# Patient Record
Sex: FEMALE | Race: WHITE | NOT HISPANIC OR LATINO | URBAN - METROPOLITAN AREA
[De-identification: names, ages, dates, MRNs, and addresses within clinical notes are randomized per-mention and may not be internally consistent; named-entity substitution may affect disease eponyms.]

---

## 2018-06-29 ENCOUNTER — INPATIENT (INPATIENT)
Facility: HOSPITAL | Age: 53
LOS: 1 days | Discharge: HOME | End: 2018-07-01
Attending: INTERNAL MEDICINE | Admitting: INTERNAL MEDICINE
Payer: COMMERCIAL

## 2018-06-29 VITALS
OXYGEN SATURATION: 95 % | HEIGHT: 66 IN | SYSTOLIC BLOOD PRESSURE: 94 MMHG | HEART RATE: 73 BPM | TEMPERATURE: 98 F | WEIGHT: 139.99 LBS | RESPIRATION RATE: 18 BRPM | DIASTOLIC BLOOD PRESSURE: 55 MMHG

## 2018-06-29 LAB
ALBUMIN SERPL ELPH-MCNC: 3.8 G/DL — SIGNIFICANT CHANGE UP (ref 3.3–5)
ALP SERPL-CCNC: 46 U/L — SIGNIFICANT CHANGE UP (ref 40–120)
ALT FLD-CCNC: 21 U/L — SIGNIFICANT CHANGE UP (ref 12–78)
ANION GAP SERPL CALC-SCNC: 10 MMOL/L — SIGNIFICANT CHANGE UP (ref 5–17)
AST SERPL-CCNC: 14 U/L — LOW (ref 15–37)
BASOPHILS # BLD AUTO: 0.13 K/UL — SIGNIFICANT CHANGE UP (ref 0–0.2)
BASOPHILS NFR BLD AUTO: 1.4 % — SIGNIFICANT CHANGE UP (ref 0–2)
BILIRUB SERPL-MCNC: 0.3 MG/DL — SIGNIFICANT CHANGE UP (ref 0.2–1.2)
BUN SERPL-MCNC: 17 MG/DL — SIGNIFICANT CHANGE UP (ref 7–23)
CALCIUM SERPL-MCNC: 8.8 MG/DL — SIGNIFICANT CHANGE UP (ref 8.5–10.1)
CHLORIDE SERPL-SCNC: 104 MMOL/L — SIGNIFICANT CHANGE UP (ref 96–108)
CO2 SERPL-SCNC: 25 MMOL/L — SIGNIFICANT CHANGE UP (ref 22–31)
CREAT SERPL-MCNC: 0.85 MG/DL — SIGNIFICANT CHANGE UP (ref 0.5–1.3)
EOSINOPHIL # BLD AUTO: 0.23 K/UL — SIGNIFICANT CHANGE UP (ref 0–0.5)
EOSINOPHIL NFR BLD AUTO: 2.4 % — SIGNIFICANT CHANGE UP (ref 0–6)
GLUCOSE SERPL-MCNC: 114 MG/DL — HIGH (ref 70–99)
HCT VFR BLD CALC: 39.9 % — SIGNIFICANT CHANGE UP (ref 34.5–45)
HGB BLD-MCNC: 13.6 G/DL — SIGNIFICANT CHANGE UP (ref 11.5–15.5)
IMM GRANULOCYTES NFR BLD AUTO: 0.3 % — SIGNIFICANT CHANGE UP (ref 0–1.5)
LYMPHOCYTES # BLD AUTO: 4.3 K/UL — HIGH (ref 1–3.3)
LYMPHOCYTES # BLD AUTO: 44.9 % — HIGH (ref 13–44)
MANUAL SMEAR VERIFICATION: SIGNIFICANT CHANGE UP
MCHC RBC-ENTMCNC: 30 PG — SIGNIFICANT CHANGE UP (ref 27–34)
MCHC RBC-ENTMCNC: 34.1 GM/DL — SIGNIFICANT CHANGE UP (ref 32–36)
MCV RBC AUTO: 87.9 FL — SIGNIFICANT CHANGE UP (ref 80–100)
MONOCYTES # BLD AUTO: 0.81 K/UL — SIGNIFICANT CHANGE UP (ref 0–0.9)
MONOCYTES NFR BLD AUTO: 8.5 % — SIGNIFICANT CHANGE UP (ref 2–14)
NEUTROPHILS # BLD AUTO: 4.08 K/UL — SIGNIFICANT CHANGE UP (ref 1.8–7.4)
NEUTROPHILS NFR BLD AUTO: 42.5 % — LOW (ref 43–77)
NRBC # BLD: 0 /100 WBCS — SIGNIFICANT CHANGE UP (ref 0–0)
PLAT MORPH BLD: NORMAL — SIGNIFICANT CHANGE UP
PLATELET # BLD AUTO: 253 K/UL — SIGNIFICANT CHANGE UP (ref 150–400)
POTASSIUM SERPL-MCNC: 3.3 MMOL/L — LOW (ref 3.5–5.3)
POTASSIUM SERPL-SCNC: 3.3 MMOL/L — LOW (ref 3.5–5.3)
PROT SERPL-MCNC: 7.3 GM/DL — SIGNIFICANT CHANGE UP (ref 6–8.3)
RBC # BLD: 4.54 M/UL — SIGNIFICANT CHANGE UP (ref 3.8–5.2)
RBC # FLD: 14.5 % — SIGNIFICANT CHANGE UP (ref 10.3–14.5)
RBC BLD AUTO: NORMAL — SIGNIFICANT CHANGE UP
SODIUM SERPL-SCNC: 139 MMOL/L — SIGNIFICANT CHANGE UP (ref 135–145)
TROPONIN I SERPL-MCNC: <0.015 NG/ML — SIGNIFICANT CHANGE UP (ref 0.01–0.04)
WBC # BLD: 9.58 K/UL — SIGNIFICANT CHANGE UP (ref 3.8–10.5)
WBC # FLD AUTO: 9.58 K/UL — SIGNIFICANT CHANGE UP (ref 3.8–10.5)

## 2018-06-29 PROCEDURE — 99285 EMERGENCY DEPT VISIT HI MDM: CPT

## 2018-06-29 PROCEDURE — 71045 X-RAY EXAM CHEST 1 VIEW: CPT | Mod: 26

## 2018-06-29 PROCEDURE — 93010 ELECTROCARDIOGRAM REPORT: CPT

## 2018-06-29 PROCEDURE — 70450 CT HEAD/BRAIN W/O DYE: CPT | Mod: 26

## 2018-06-29 RX ORDER — SODIUM CHLORIDE 9 MG/ML
3 INJECTION INTRAMUSCULAR; INTRAVENOUS; SUBCUTANEOUS ONCE
Qty: 0 | Refills: 0 | Status: COMPLETED | OUTPATIENT
Start: 2018-06-29 | End: 2018-06-29

## 2018-06-29 RX ORDER — SODIUM CHLORIDE 9 MG/ML
2000 INJECTION INTRAMUSCULAR; INTRAVENOUS; SUBCUTANEOUS ONCE
Qty: 0 | Refills: 0 | Status: COMPLETED | OUTPATIENT
Start: 2018-06-29 | End: 2018-06-29

## 2018-06-29 RX ADMIN — SODIUM CHLORIDE 3 MILLILITER(S): 9 INJECTION INTRAMUSCULAR; INTRAVENOUS; SUBCUTANEOUS at 21:12

## 2018-06-29 RX ADMIN — SODIUM CHLORIDE 2000 MILLILITER(S): 9 INJECTION INTRAMUSCULAR; INTRAVENOUS; SUBCUTANEOUS at 21:12

## 2018-06-29 NOTE — ED PROVIDER NOTE - NS_ ATTENDINGSCRIBEDETAILS _ED_A_ED_FT
I, Roderick Lott MD,  performed the initial face to face bedside interview with this patient regarding history of present illness, review of symptoms and relevant past medical, social and family history.  I completed an independent physical examination.    The history, relevant review of systems, past medical and surgical history, medical decision making, and physical examination was documented by the scribe in my presence and I attest to the accuracy of the documentation.

## 2018-06-29 NOTE — ED PROVIDER NOTE - OBJECTIVE STATEMENT
51 y/o F presenting to the ED c/o syncope. Pt states that she was out to dinner with her friends, had one drink (Double White Russian), and then went to wait in line at the [employer]. After 10 minutes of waiting in line, pt reports that she passed out. Syncopal episode was witnessed and lasted ~1 minute. States that while he did not drink a lot, she has not drank alcohol in one year. Notes hx of syncopal episode in her 20s. She went to the ER after this episode and they could not determine the cause of the episode. No chronic medical conditions or daily medications. Smoker (>1ppd). Pt does not have a PCP.

## 2018-06-29 NOTE — ED PROVIDER NOTE - PROGRESS NOTE DETAILS
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 Neurology paged several times for consult. No call back.

## 2018-06-29 NOTE — ED ADULT TRIAGE NOTE - CHIEF COMPLAINT QUOTE
Patient states that she was outside of a bar and "passed out." Patient reports that she felt herself overheating and then "passed out." Reports drinking one mixed drink prior to episode.

## 2018-06-29 NOTE — ED ADULT NURSE NOTE - OBJECTIVE STATEMENT
presents s/p syncope while waiting approx 10 mins to get into a concert. was lowered to the floor. states that she hit the back of her head. lost consciousness for about 10 mins. denies any symptoms at this time.

## 2018-06-30 DIAGNOSIS — R55 SYNCOPE AND COLLAPSE: ICD-10-CM

## 2018-06-30 LAB
ANION GAP SERPL CALC-SCNC: 7 MMOL/L — SIGNIFICANT CHANGE UP (ref 5–17)
BUN SERPL-MCNC: 17 MG/DL — SIGNIFICANT CHANGE UP (ref 7–23)
CALCIUM SERPL-MCNC: 8 MG/DL — LOW (ref 8.5–10.1)
CHLORIDE SERPL-SCNC: 111 MMOL/L — HIGH (ref 96–108)
CO2 SERPL-SCNC: 23 MMOL/L — SIGNIFICANT CHANGE UP (ref 22–31)
CREAT SERPL-MCNC: 0.69 MG/DL — SIGNIFICANT CHANGE UP (ref 0.5–1.3)
GLUCOSE SERPL-MCNC: 97 MG/DL — SIGNIFICANT CHANGE UP (ref 70–99)
POTASSIUM SERPL-MCNC: 4.4 MMOL/L — SIGNIFICANT CHANGE UP (ref 3.5–5.3)
POTASSIUM SERPL-SCNC: 4.4 MMOL/L — SIGNIFICANT CHANGE UP (ref 3.5–5.3)
SODIUM SERPL-SCNC: 141 MMOL/L — SIGNIFICANT CHANGE UP (ref 135–145)

## 2018-06-30 PROCEDURE — 93306 TTE W/DOPPLER COMPLETE: CPT | Mod: 26

## 2018-06-30 PROCEDURE — 99222 1ST HOSP IP/OBS MODERATE 55: CPT

## 2018-06-30 RX ORDER — HEPARIN SODIUM 5000 [USP'U]/ML
5000 INJECTION INTRAVENOUS; SUBCUTANEOUS EVERY 8 HOURS
Qty: 0 | Refills: 0 | Status: DISCONTINUED | OUTPATIENT
Start: 2018-06-30 | End: 2018-07-01

## 2018-06-30 RX ORDER — ACETAMINOPHEN 500 MG
650 TABLET ORAL EVERY 8 HOURS
Qty: 0 | Refills: 0 | Status: DISCONTINUED | OUTPATIENT
Start: 2018-06-30 | End: 2018-07-01

## 2018-06-30 RX ORDER — POTASSIUM CHLORIDE 20 MEQ
40 PACKET (EA) ORAL ONCE
Qty: 0 | Refills: 0 | Status: COMPLETED | OUTPATIENT
Start: 2018-06-30 | End: 2018-06-30

## 2018-06-30 RX ORDER — SODIUM CHLORIDE 9 MG/ML
1000 INJECTION INTRAMUSCULAR; INTRAVENOUS; SUBCUTANEOUS
Qty: 0 | Refills: 0 | Status: DISCONTINUED | OUTPATIENT
Start: 2018-06-30 | End: 2018-07-01

## 2018-06-30 RX ADMIN — SODIUM CHLORIDE 100 MILLILITER(S): 9 INJECTION INTRAMUSCULAR; INTRAVENOUS; SUBCUTANEOUS at 06:34

## 2018-06-30 RX ADMIN — Medication 40 MILLIEQUIVALENT(S): at 06:34

## 2018-06-30 NOTE — H&P ADULT - HISTORY OF PRESENT ILLNESS
53 y/o F presenting to the ED c/o syncope. Pt states that she was out to dinner with her friends, had one drink (Double White Russian), and then went to wait in line at the [employer]. After 10 minutes of waiting in line, pt reports that she passed out. Syncopal episode was witnessed and lasted ~1 minute. States that while she did not drink a lot, she has not drank alcohol in one year. Notes hx of syncopal episode in her 20s. She went to the ER after that episode and they could not determine the cause of the episode. No chronic medical conditions or daily medications. Smoker (>1ppd). Pt does not have a PCP.

## 2018-06-30 NOTE — H&P ADULT - NSHPPHYSICALEXAM_GEN_ALL_CORE
Vital Signs Last 24 Hrs  T(C): 36.3 (30 Jun 2018 04:37), Max: 36.4 (29 Jun 2018 20:48)  T(F): 97.3 (30 Jun 2018 04:37), Max: 97.6 (29 Jun 2018 20:48)  HR: 65 (30 Jun 2018 04:37) (65 - 76)  BP: 119/71 (30 Jun 2018 04:37) (94/55 - 119/71)  RR: 16 (30 Jun 2018 04:37) (16 - 18)  SpO2: 100% (30 Jun 2018 04:37) (95% - 100%)

## 2018-06-30 NOTE — H&P ADULT - NEUROLOGICAL DETAILS
alert and oriented x 3/responds to verbal commands/cranial nerves intact/normal strength/sensation intact/deep reflexes intact/responds to pain

## 2018-06-30 NOTE — H&P ADULT - NECK DETAILS
January 31, 2017    Eunice Ibarra  [address] [facility] [place] [address]             [facility]  [address]  [facility] [address]  Phone: [phone]  Fax: [phone] Mr. Ibarra,      We have tried many times over the last 2 weeks to return your call regarding your wife.  If you still need us to assist you with the paperwork, please give us a call at [phone].    Thanks,    Tamie     
no JVD/supple

## 2018-06-30 NOTE — H&P ADULT - ASSESSMENT
51 yo female presented after syncope.    A/P:    Syncope  -clinically better  -follow in telemetry  -follow MR brain  -follow echo and carotid  -will follow Neurology consult  -follow clinically     Hypokalemia  -will replace potassium

## 2018-06-30 NOTE — PROGRESS NOTE ADULT - SUBJECTIVE AND OBJECTIVE BOX
CHIEF COMPLAINT: syncope    SUBJECTIVE: jesse to 40-50s on tele even while awake and ambulating, asymptomatic.    REVIEW OF SYSTEMS: All other review of systems is negative unless indicated above    Vital Signs Last 24 Hrs  T(C): 36.7 (30 Jun 2018 16:51), Max: 37.3 (30 Jun 2018 11:31)  T(F): 98.1 (30 Jun 2018 16:51), Max: 99.1 (30 Jun 2018 11:31)  HR: 63 (30 Jun 2018 16:51) (56 - 76)  BP: 128/54 (30 Jun 2018 16:51) (94/55 - 128/54)  RR: 18 (30 Jun 2018 16:51) (16 - 18)  SpO2: 97% (30 Jun 2018 16:51) (95% - 100%)    PHYSICAL EXAM:  Constitutional: NAD, awake and alert  HEENT: EOMI, Normal Hearing, MMM  Neck: Soft and supple, No JVD  Respiratory: Breath sounds are clear bilaterally, No wheezing, rales or rhonchi  Cardiovascular: S1 and S2, regular rate and rhythm, no Murmurs, gallops or rubs  Gastrointestinal: Bowel Sounds present, soft, nontender, nondistended, no guarding, no rebound  Extremities: No peripheral edema  Vascular: 2+ peripheral pulses  Neurological: A/O x 3, no focal deficits  Musculoskeletal: 5/5 strength b/l upper and lower extremities  Skin: No rashes    MEDICATIONS:  MEDICATIONS  (STANDING):  heparin  Injectable 5000 Unit(s) SubCutaneous every 8 hours  sodium chloride 0.9%. 1000 milliLiter(s) (100 mL/Hr) IV Continuous <Continuous>    LABS: All Labs Reviewed:                        13.6   9.58  )-----------( 253      ( 29 Jun 2018 21:06 )             39.9     141  |  111<H>  |  17  ----------------------------<  97  4.4   |  23  |  0.69    Ca    8.0<L>      30 Jun 2018 08:14    TPro  7.3  /  Alb  3.8  /  TBili  0.3  /  DBili  x   /  AST  14<L>  /  ALT  21  /  AlkPhos  46  06-29    CARDIAC MARKERS ( 29 Jun 2018 21:06 )  <0.015 ng/mL / x     / x     / x     / x

## 2018-07-01 ENCOUNTER — TRANSCRIPTION ENCOUNTER (OUTPATIENT)
Age: 53
End: 2018-07-01

## 2018-07-01 VITALS
OXYGEN SATURATION: 100 % | HEART RATE: 57 BPM | DIASTOLIC BLOOD PRESSURE: 59 MMHG | TEMPERATURE: 98 F | RESPIRATION RATE: 16 BRPM | SYSTOLIC BLOOD PRESSURE: 101 MMHG

## 2018-07-01 NOTE — DISCHARGE NOTE ADULT - HOSPITAL COURSE
CC: Syncope  Subjective: No new complaints. wants to go home.  Evaluated by cardio this AM, do not feel pt needs any further cardiac workup in hospital.  ROS: Neg except as above    Vital Signs Last 24 Hrs  T(C): 37.2 (01 Jul 2018 05:33), Max: 37.3 (30 Jun 2018 11:31)  T(F): 98.9 (01 Jul 2018 05:33), Max: 99.1 (30 Jun 2018 11:31)  HR: 69 (01 Jul 2018 05:33) (56 - 69)  BP: 115/63 (01 Jul 2018 05:33) (103/60 - 128/66)  RR: 18 (01 Jul 2018 05:33) (17 - 18)  SpO2: 98% (01 Jul 2018 05:33) (97% - 100%)    PHYSICAL EXAM:  Constitutional: NAD, well-groomed, well-developed  HEENT: PERRLA, EOMI, Normal Hearing, MMM  Neck: No LAD, No JVD  Back: Normal spine flexure, No CVA tenderness  Respiratory: CTABL  Cardiovascular: S1 and S2, RRR, no M/G/R  Gastrointestinal: BS+, soft, NT/ND  Extremities: No peripheral edema  Vascular: 2+ peripheral pulses  Neurological: A/O x 3, no focal deficits  Psychiatric: Normal mood, normal affect  Musculoskeletal: 5/5 strength b/l upper and lower extremities  Skin: No rashes               13.6   9.58  )-----------( 253      ( 29 Jun 2018 21:06 )             39.9    141  |  111<H>  |  17  ----------------------------<  97  4.4   |  23  |  0.69    Ca    8.0<L>      30 Jun 2018 08:14    TPro  7.3  /  Alb  3.8  /  TBili  0.3  /  DBili  x   /  AST  14<L>  /  ALT  21  /  AlkPhos  46  06-29    CARDIAC MARKERS ( 29 Jun 2018 21:06 )  <0.015 ng/mL / x     / x     / x     / x        LIVER FUNCTIONS - ( 29 Jun 2018 21:06 )  Alb: 3.8 g/dL / Pro: 7.3 gm/dL / ALK PHOS: 46 U/L / ALT: 21 U/L / AST: 14 U/L / GGT: x           A/P  53 yo F admitted for syncopal evaluation    # Syncope  - likely vasovagal episode.  Less likely related to bradycardia.  - sinus jesse on tele while awake and ambulating however asymptomatic  - Echo unremarkable with EF 55-60%   - incidental finding on CT head noted by neuro- can be followed up on outpt basis with MRI, unlikely related to syncope.  - neuro appreciated  - cardio eval appreciated- outpt workup recommended    # hypokalemia  - resolved     time spent on discharge 25 minutes

## 2018-07-01 NOTE — DISCHARGE NOTE ADULT - CARE PROVIDER_API CALL
Onur Wells (MD), Cardiovascular Disease; Internal Medicine; Nuclear Cardiology  [address] [place] [address]  Phone: [phone]  Fax: [phone]

## 2018-07-01 NOTE — CONSULT NOTE ADULT - SUBJECTIVE AND OBJECTIVE BOX
CHIEF COMPLAINT: Patient is a 52y old  Female who presents with a chief complaint of complain of syncope (30 Jun 2018 04:57)      HPI:  53 y/o F presenting to the ED c/o syncope. Pt states that she was out to dinner with her friends, had one drink (Double White Russian), and then went to wait in line at the [employer]. After 10 minutes of waiting in line, pt reports that she passed out. Syncopal episode was witnessed and lasted ~1 minute. States that while she did not drink a lot, she has not drank alcohol in one year. Notes hx of syncopal episode in her 20s. She went to the ER after that episode and they could not determine the cause of the episode. No chronic medical conditions or daily medications. Smoker (>1ppd). Pt does not have a PCP. (30 Jun 2018 04:57)      PMHx: PAST MEDICAL & SURGICAL HISTORY:  No pertinent past medical history  No significant past surgical history        Soc Hx: smoking       Allergies: Allergies    No Known Allergies    Intolerances          REVIEW OF SYSTEMS:    CONSTITUTIONAL: No weakness, fevers or chills  EYES/ENT: No visual changes;  No vertigo or throat pain   NECK: No pain or stiffness  RESPIRATORY: No cough, wheezing, hemoptysis; No shortness of breath  CARDIOVASCULAR: No chest pain or palpitations  GASTROINTESTINAL: No abdominal or epigastric pain. No nausea, vomiting, or hematemesis; No diarrhea or constipation. No melena or hematochezia.  GENITOURINARY: No dysuria, frequency or hematuria  NEUROLOGICAL: No numbness or weakness  SKIN: No itching, burning, rashes, or lesions   All other review of systems is negative unless indicated above    Vital Signs Last 24 Hrs  T(C): 37.2 (01 Jul 2018 05:33), Max: 37.3 (30 Jun 2018 11:31)  T(F): 98.9 (01 Jul 2018 05:33), Max: 99.1 (30 Jun 2018 11:31)  HR: 69 (01 Jul 2018 05:33) (56 - 69)  BP: 115/63 (01 Jul 2018 05:33) (103/60 - 128/66)  BP(mean): --  RR: 18 (01 Jul 2018 05:33) (17 - 18)  SpO2: 98% (01 Jul 2018 05:33) (97% - 100%)    I&O's Summary          PHYSICAL EXAM:   Constitutional: NAD, awake and alert, well-developed  HEENT: PERR, EOMI, Normal Hearing, MMM  Neck: Soft and supple, No LAD, No JVD  Respiratory: Breath sounds are clear bilaterally, No wheezing, rales or rhonchi  Cardiovascular: S1 and S2, regular rate and rhythm, no Murmurs, gallops or rubs  Gastrointestinal: Bowel Sounds present, soft, nontender, nondistended, no guarding, no rebound  Extremities: No peripheral edema  Vascular: 2+ peripheral pulses  Neurological: A/O x 3, no focal deficits  Musculoskeletal: 5/5 strength b/l upper and lower extremities  Skin: No rashes    MEDICATIONS:  MEDICATIONS  (STANDING):  heparin  Injectable 5000 Unit(s) SubCutaneous every 8 hours  sodium chloride 0.9%. 1000 milliLiter(s) (100 mL/Hr) IV Continuous <Continuous>      LABS: All Labs Reviewed:                        13.6   9.58  )-----------( 253      ( 29 Jun 2018 21:06 )             39.9     06-30    141  |  111<H>  |  17  ----------------------------<  97  4.4   |  23  |  0.69    Ca    8.0<L>      30 Jun 2018 08:14    TPro  7.3  /  Alb  3.8  /  TBili  0.3  /  DBili  x   /  AST  14<L>  /  ALT  21  /  AlkPhos  46  06-29      CARDIAC MARKERS ( 29 Jun 2018 21:06 )  <0.015 ng/mL / x     / x     / x     / x          EKG: SR, iRBBB, no significant ST changes     Telemetry: SR, breif ventricular bigeminy and tigeminy    ECHO:< from: Transthoracic Echocardiogram (06.30.18 @ 09:15) >     Summary     Normal appearing mitral valve structure and function.   Mild (1+) mitral regurgitation is present.   Normal aortic valve structure and function.   No aortic regurgitation is present.   Mild (1+) tricuspid valve regurgitation is present.   Normal appearing pulmonic valve structure and function.   Normal appearing left atrium.   The left ventricle isnormal in size, wall thickness, wall motion and   contractility.   Estimated left ventricular ejection fraction is 55-60 %.   Normal appearing right atrium.   Normal appearing right ventricle structure and function.   All visualized extra cardiac structures appears to be normal.   No evidence of pericardial effusion.     Signature     ----------------------------------------------------------------   Electronically signed by Brennon Narayan MD(Interpreting   physician) on 06/30/2018 10:58 AM    < end of copied text >
Neurology Consult requested by:   Patient is a 52y old  Female who presents with a chief complaint of complain of syncope (30 Jun 2018 04:57)     HPI:  53 y/o F presenting to the ED c/o syncope. Pt states that she was out to dinner with her friends, had one drink (Double White Russian), and then went to wait in line at the [employer]. After 10 minutes of waiting in line, pt reports that she passed out. Syncopal episode was witnessed and lasted ~1 minute. States that while she did not drink a lot, she has not drank alcohol in one year. Prior to event, felt dizzy, afterwards felt nauseous, clammy. No headache, diff speaking, no unilateral weakness, no tongue bitting, no incontinence.  Notes hx of syncopal episode in her 20s. She went to the ER after that episode and they could not determine the cause of the episode. No chronic medical conditions or daily medications. Smoker (>1ppd). Pt does not have a PCP. (30 Jun 2018 04:57)      PAST MEDICAL & SURGICAL HISTORY:  No pertinent past medical history  No significant past surgical history    FAMILY HISTORY:  No pertinent family history in first degree relatives    Social Hx:  Nonsmoker, no drug or alcohol use  Medications and Allergies ReviewedMEDICATIONS  (STANDING):  heparin  Injectable 5000 Unit(s) SubCutaneous every 8 hours  sodium chloride 0.9%. 1000 milliLiter(s) (100 mL/Hr) IV Continuous <Continuous>     ROS: Pertinent positives in HPI, all other ROS were reviewed and are negative.      Examination:   Vital Signs Last 24 Hrs  T(C): 36.7 (30 Jun 2018 06:08), Max: 36.7 (30 Jun 2018 06:08)  T(F): 98 (30 Jun 2018 06:08), Max: 98 (30 Jun 2018 06:08)  HR: 72 (30 Jun 2018 06:08) (65 - 76)  BP: 110/66 (30 Jun 2018 06:08) (94/55 - 119/71)  BP(mean): --  RR: 18 (30 Jun 2018 06:08) (16 - 18)  SpO2: 98% (30 Jun 2018 06:08) (95% - 100%)  General: Cooperative, NAD   NECK: supple, no masses  ENT: Normal hearing   Vascular : no carotid bruits,   Lungs: CTAB  Chest: RRR, no murmurs  Extremities: nontender, no edema  Musculoskeletal: no adventitious movements, no joint stiffness  Skin: no rash    Neurological Examination:  NIHSS:  MS: AOx3. Appropriately interactive, normal affect. Speech fluent w/o paraphasic error, repetition, naming, reading is intact   CN: No Papilledema, PERLL, EOMI, V1-3 sensation intact, face symmetric, hearing intact, palate elevates symmetrically, tongue midline, SCM equal bilaterally  Motor: normal bulk and tone, no tremor, rigidity or bradykinesia.  5/5 all over   Sens: Intact to light touch.    Reflexes: 2/4 all over, downgoing toes b/l  Coord:  No dysmetria, CARMEN intact   Gait: Cannot test    Labs: Reviewed  Imaging: Reviewed    < from: CT Head No Cont (06.29.18 @ 21:27) >  MPRESSION:     No evidence of acute intracranial hemorrhage, midline shift or CT   evidence of acute territorial infarct.    Parallel configuration of the lateral ventricles with a high riding third   ventricle suspicious for corpus callosal agenesis. Correlation with prior   imaging is suggested if available. MRI suggested for further evaluation.    < end of copied text >

## 2018-07-01 NOTE — CONSULT NOTE ADULT - ASSESSMENT
52 f with syncopal event now feelign well    echocardiogram  with normal EF    patient feeling well and stable on feet now- recommended  hydration and increased salt intake      recommend completion of cardiac work up as an outpaient- Stress and carotid     no cardiac  contraindication to discharge
52 year old woman s/p syncopal episode. Non focal exam, Ct of head showed incidental finding. Doubt ischemia, seizures. Likely vaso vagal event.

## 2018-07-01 NOTE — DISCHARGE NOTE ADULT - CARE PLAN
Principal Discharge DX:	Syncope and collapse  Goal:	medical follow up  Assessment and plan of treatment:	follow up with Dr. Wells

## 2018-07-01 NOTE — DISCHARGE NOTE ADULT - PATIENT PORTAL LINK FT
You can access the [facility] Patient Portal, offered by [facility], by registering with the following website: [email or web address]

## 2018-07-06 DIAGNOSIS — R00.1 BRADYCARDIA, UNSPECIFIED: ICD-10-CM

## 2018-07-06 DIAGNOSIS — R55 SYNCOPE AND COLLAPSE: ICD-10-CM

## 2018-07-06 DIAGNOSIS — E87.6 HYPOKALEMIA: ICD-10-CM

## 2018-12-14 PROBLEM — F17.200 NICOTINE DEPENDENCE, UNSPECIFIED, UNCOMPLICATED: Chronic | Status: ACTIVE | Noted: 2018-07-01

## 2018-12-14 PROBLEM — R55 SYNCOPE AND COLLAPSE: Chronic | Status: ACTIVE | Noted: 2018-07-01

## 2019-01-04 ENCOUNTER — APPOINTMENT (OUTPATIENT)
Dept: OPHTHALMOLOGY | Facility: CLINIC | Age: 54
End: 2019-01-04

## 2022-09-12 NOTE — DISCHARGE NOTE ADULT - DISCHARGE DATE
01-Jul-2018
[FreeTextEntry2] : 62yo female with right sided buttock pain for a few weeks. Pain is worse after standing for long periods of time. Also reports left anterior thigh pain ongoing concurrently with associated numbness and tingling. Has tried topical ointments with mild relief. Had similar symptoms on the left in 2018 which resolved with PT.

## 2024-11-15 NOTE — ED ADULT TRIAGE NOTE - NS ED TRIAGE AVPU SCALE
Patient presented to unit to have port accessed for CT scan. No complaints voiced. Port accessed without difficulty. Patient returned to unit after CT. Port with blood return, flushed with NS and heparin locked. Pt ambulated off unit in NAD.      Problem: Infection  Goal: Absence of Infection Signs and Symptoms  Outcome: Progressing      Alert-The patient is alert, awake and responds to voice. The patient is oriented to time, place, and person. The triage nurse is able to obtain subjective information.

## 2025-03-21 NOTE — PATIENT PROFILE ADULT. - FUNCTIONAL SCREEN CURRENT LEVEL: AMBULATION, MLM
-Sprains can take days to months to recover.  As the pain and swelling improve, gently begin using the injured area to prevent stillness.    -Over the counter GELS for pain (gel better than cream):  Voltaren gel for inflammatory pain.  Lidocaine or Capsaicin gel for nerve pain or pain in general.     -Acetaminophen (brand name Tylenol) 500 mg take 1-2 tablets every 4-6 hours as needed for headache, aches, pains.  Try not to exceed more than 3000 mg (6 tablets) in a 24 hour period. If Tylenol is helpful, better option for pain as it has less side effects.  Avoid alcohol with Acetaminophen. Avoid Acetaminophen if you have liver disease.    -NSAIDs, several types: Ibuprofen (Advil, Motrin), Naproxen (Aleve), Aspirin, are all the same type of medication.  NSAIDs are helpful to reduce fever, migraines, menstrual cramps, inflammation from injury.  Ex. Ibuprofen 600 mg every 6-8 hours as needed for pain.  Avoid NSAIDS if you have a history of stroke, heart failure, had had a heart attack, kidney disease, have a history of a stomach ulcer or gastrointestinal bleed, or you are on a blood thinner.    -Follow up with your primary care provider in 1-2 weeks for a repeat/follow up exam and if symptoms persist.    -Return to Urgent Care or go to the ER if symptoms worsen.    -You can call the below number to set up an appointment with Podiatry    Central scheduling phone number:   [phone]       (0) independent